# Patient Record
(demographics unavailable — no encounter records)

---

## 2025-06-19 NOTE — HISTORY OF PRESENT ILLNESS
[FreeTextEntry1] : 09/15/22 Seen in 3/22 for post covid cough which has resolved  no real wgt loss no sob  was seen by [facility] by report nl evaluation  07/27/23 has ahd elev BP  getting wgt loss counselling  has snoring at nite   has periodic rapid HR resolves w valsalva  2/8/24  Wgt elevated  arrhythmias resolved  09/19/24 periodic arrhythmias and palpitations  beats are describes as skipped  lost 30 lbs on wegovy  6/19/25 was on wegovy and had wgt loss was off meds due to shortage  now back on it but still gained wgt  BMI 44

## 2025-06-19 NOTE — REVIEW OF SYSTEMS
[Feeling Fatigued] : feeling fatigued [Cough] : cough [Negative] : Musculoskeletal [Abdominal Pain] : no abdominal pain [Change in Appetite] : no change in appetite [Constipation] : no constipation

## 2025-06-19 NOTE — ASSESSMENT
[FreeTextEntry1] : Impression  options for obesity discussed both ozempic  and bariatric surgery will inc dose of GLP to 2.4 mg qweek of wegovy   Lifestyle changes for control of BP reviewed ie wgt reduction, salt restriction, Etoh avoidance, exercise 30 min aerobic activity per day, avoid NSAID use self monitor BP TIW Lifestyle advice for LDL reduction including reduction of red meat consumption concentrating on a plant based diet. 30 min aerobic exercise per day. Calorie reduction to target BMI<25 Salt reduction calorie reduction

## 2025-06-19 NOTE — DISCUSSION/SUMMARY
[FreeTextEntry1] : Hypertension: The impression is hypertension. The diagnostic plan includes basic metabolic panel, thyroid function tests and echocardiogram.There are no changes in medication management. Other planned treatment includes a home monitor, low sodium diet and non-steroidal anti-inflammatory drugs avoidance. The plan was discussed with the patient. DAIANA.  Will try phenterimine for wgt loss

## 2025-06-19 NOTE — ADDENDUM
[FreeTextEntry1] : Logan Johansen assisted in documentation on 6/19/25 acting as a scribe for Dr. Cresencio Guaman.

## 2025-06-19 NOTE — PHYSICAL EXAM
[Well Developed] : well developed [Well Nourished] : well nourished [No Acute Distress] : no acute distress [Obese] : obese [Normal Conjunctiva] : normal conjunctiva [Normal Venous Pressure] : normal venous pressure [5th Left ICS - MCL] : palpated at the 5th LICS in the midclavicular line [Normal S1] : normal S1 [Normal S2] : normal S2 [S4] : an S4 was heard [II] : a grade 2 [Normal] : no rash, no skin lesions [S3] : no S3 [Right Carotid Bruit] : no bruit heard over the right carotid [Left Carotid Bruit] : no bruit heard over the left carotid [de-identified] : 17 inch